# Patient Record
Sex: MALE | Race: WHITE | Employment: FULL TIME | URBAN - METROPOLITAN AREA
[De-identification: names, ages, dates, MRNs, and addresses within clinical notes are randomized per-mention and may not be internally consistent; named-entity substitution may affect disease eponyms.]

---

## 2018-11-13 ENCOUNTER — TELEPHONE (OUTPATIENT)
Dept: ORTHOPEDIC SURGERY | Age: 63
End: 2018-11-13

## 2018-11-13 ENCOUNTER — OFFICE VISIT (OUTPATIENT)
Dept: ORTHOPEDIC SURGERY | Age: 63
End: 2018-11-13
Payer: COMMERCIAL

## 2018-11-13 VITALS — BODY MASS INDEX: 28.23 KG/M2 | WEIGHT: 220 LBS | HEIGHT: 74 IN

## 2018-11-13 DIAGNOSIS — M19.011 PRIMARY OSTEOARTHRITIS, RIGHT SHOULDER: ICD-10-CM

## 2018-11-13 DIAGNOSIS — S46.011A STRAIN OF MUSC/TEND THE ROTATOR CUFF OF RIGHT SHOULDER, INIT: ICD-10-CM

## 2018-11-13 DIAGNOSIS — M25.511 RIGHT SHOULDER PAIN, UNSPECIFIED CHRONICITY: Primary | ICD-10-CM

## 2018-11-13 DIAGNOSIS — M75.01 ADHESIVE CAPSULITIS OF RIGHT SHOULDER: ICD-10-CM

## 2018-11-13 PROCEDURE — 99203 OFFICE O/P NEW LOW 30 MIN: CPT | Performed by: ORTHOPAEDIC SURGERY

## 2018-11-13 NOTE — TELEPHONE ENCOUNTER
JUAREZ FOR PATIENT REGARDING MRI. MRI APPROVED (AUTH# [id number]) AND IS READY TO BE SCHEDULED BY THE PATIENT AT HIS EARLIEST CONVENIENCE AT [facility].

## 2018-11-13 NOTE — PROGRESS NOTES
Chief Complaint  Shoulder Pain (R SHOULDER PAIN )      History of Present Illness:  Bahman Astorga is a 59 y.o. male. He's had fairly chronic right greater than left shoulder symptoms and he is dominant right-handed. His symptoms began over 10 years ago and arrange from 5-9/10 in severity. He has intermittent symptoms but he states are worsening. He is pain stiffness and weakness of the right shoulder. He has lost range of motion. It wakes him at nighttime. Certain positions such as abduction significantly increase his symptoms. No numbness or tingling. Medical History  Patient's medications, allergies, past medical, surgical, social and family histories were reviewed and updated as appropriate. Review of Systems  Relevant review of systems reviewed and available in the patient's chart    Vital Signs  There were no vitals filed for this visit. General Exam:   Constitutional: Patient is adequately groomed with no evidence of malnutrition  Mental Status: The patient is oriented to time, place and person. The patient's mood and affect are appropriate. Lymphatic: The lymphatic examination bilaterally reveals all areas to be without enlargement or induration. Shoulder Examination  Inspection:  There is no deformity, ecchymosis or atrophy present. Mild anterior swelling noted. Palpation: Mild glenohumeral crepitus. Moderate anterior tenderness. No evidence of any masses. Rang of Motion:  He lacks 25° of abduction. He lacks about 15° of forward flexion. External rotation lacks about 15-20°. Internal rotation only goes to his hip pocket. Strength:  Supraspinatus strength is weakened on the right compared to the left. Rest of the muscular strengthening is within normal limits    Special Tests:  Drop arm, cross arm, Impingement, Yergason's, Speed's, and Webster are all normal.      Skin: There are no rashes, ulcerations or lesions.     [place]: Normal    Additional Comments:     Additional

## 2018-11-15 ENCOUNTER — OFFICE VISIT (OUTPATIENT)
Dept: ORTHOPEDIC SURGERY | Age: 63
End: 2018-11-15
Payer: COMMERCIAL

## 2018-11-15 VITALS
HEIGHT: 74 IN | SYSTOLIC BLOOD PRESSURE: 129 MMHG | BODY MASS INDEX: 28.23 KG/M2 | WEIGHT: 220 LBS | DIASTOLIC BLOOD PRESSURE: 80 MMHG

## 2018-11-15 DIAGNOSIS — M54.2 NECK PAIN: ICD-10-CM

## 2018-11-15 DIAGNOSIS — M50.30 DDD (DEGENERATIVE DISC DISEASE), CERVICAL: ICD-10-CM

## 2018-11-15 DIAGNOSIS — M47.812 SPONDYLOSIS OF CERVICAL REGION WITHOUT MYELOPATHY OR RADICULOPATHY: Primary | ICD-10-CM

## 2018-11-15 PROCEDURE — 99213 OFFICE O/P EST LOW 20 MIN: CPT | Performed by: PHYSICAL MEDICINE & REHABILITATION

## 2018-11-15 RX ORDER — ESZOPICLONE 3 MG/1
TABLET, FILM COATED ORAL
Refills: 5 | COMMUNITY
Start: 2018-10-03 | End: 2019-01-25 | Stop reason: CLARIF

## 2018-11-15 RX ORDER — METHYLPREDNISOLONE 4 MG/1
TABLET ORAL
Qty: 1 KIT | Refills: 0 | Status: SHIPPED | OUTPATIENT
Start: 2018-11-15 | End: 2019-01-25 | Stop reason: ALTCHOICE

## 2018-11-15 RX ORDER — PANTOPRAZOLE SODIUM 40 MG/1
TABLET, DELAYED RELEASE ORAL
Refills: 3 | COMMUNITY
Start: 2018-10-18

## 2018-11-15 RX ORDER — ATORVASTATIN CALCIUM 80 MG/1
TABLET, FILM COATED ORAL
Refills: 3 | COMMUNITY
Start: 2018-10-13

## 2018-11-15 RX ORDER — FINASTERIDE 5 MG/1
TABLET, FILM COATED ORAL
Refills: 4 | COMMUNITY
Start: 2018-08-23

## 2018-11-15 RX ORDER — CETIRIZINE HYDROCHLORIDE 10 MG/1
10 TABLET ORAL DAILY
COMMUNITY

## 2018-11-15 NOTE — PROGRESS NOTES
New Patient: SPINE    Referring Provider:  No ref. provider found    CHIEF COMPLAINT:    Chief Complaint   Patient presents with    Neck Pain     NEW CSP        HISTORY OF PRESENT ILLNESS:      · The patient is being sent at the request of No ref. provider found in consultation as a new spine patient for neck pain which is more severe on the left side. The patient is a 59 y.o. male whom reports symptoms for 6 months. Symptoms have stayed constant over the last  6 months. Patient reports there was a significant event to cause the symptoms. States his granddaughter gave him a neck massage and his discomfort flared up significantly the following day. Today discomfort is report at 7 out of 10, describing it as sharp, aching, throbbing. Symptoms are aggravated by: bending, ROM. Patient has undergone recent treatment including, topical anti-inflammatory gel, lidocaine patch, muscle relaxer with minimal relief. Patient denies previous cervical spine surgery. Patient reports he had lumbar spine surgery about 15 years ago with Dr. [facility] in [place], [place]. He reports most the pain is on the left side of the neck. This limits his rotation and lateral bending. He denies having any arm pain or weakness in the upper extremities.            Associated signs and symptoms:   Neurogenic bowel or bladder symptoms:  no   Perceived weakness:  no   Difficulty walking:  no    Recent Imaging (within past one year)   Xrays: no   MRI or CT of spine: no    Current/Past Treatment:   · Physical Therapy:  none  · Chiropractic:  none  · Injection:  none  · Medications:   NSAIDS:  Ibuprofen, Anti-Inflammatory topical gel   Muscle relaxer:  yes   Steriods:  none   Neuropathic medications:  none   Opioids:  none  · Previous surgery:  no  · Previous surgical consult:  no  · Other:  · Infection control  · Tested positive for MRSA in past 12 months:  no  · Tested positive for MSSA \"staph infection\" in past 12 months: no  · Tested positive for VRE Inspection/examination of the right lower extremity does not show any tenderness, deformity or injury. Range of motion is unremarkable. There is no gross instability. There are no rashes, ulcerations or lesions. Strength and tone are normal. No atrophy or abnormal movements are noted. · LEFT LOWER EXTREMITY:  Inspection/examination of the left lower extremity does not show any tenderness, deformity or injury. Range of motion is unremarkable. There is no gross instability. There are no rashes, ulcerations or lesions. Strength and tone are normal. No atrophy or abnormal movements are noted. Diagnostic Testing:    Xrays:   AP and lateral of the cervical spine taken today in the office show C5 6 6 7 degenerative disc disease, anterior spondylosis noted with advanced facet arthrosis C4 through C6 and C4 on C5 spondylolisthesis  MRI or CT:  None  EMG:  None  No results found for this or any previous visit. Impression (Medical Decision Making):       1. Spondylosis of cervical region without myelopathy or radiculopathy    2. DDD (degenerative disc disease), cervical    3. Neck pain        Plan (Medical Decision Making):    I discussed the diagnosis and the treatment options with Kassy Hager today. In Summary:  The various treatment options were outlined and discussed with Kassy Hager including:  Conservative care options: physical therapy, ice, medications, bracing, and activity modification. The indications for therapeutic injections. The indications for additional imaging/laboratory studies. The indications for (possible future) interventions. After considering the various options discussed, Kassy Hager elected to pursue a course of treatment that includes the followin. Medications: I will prescribe a medrol dose pack to help with the inflammatory component of pain. Risks, benefits and alternatives were discussed.  Recommended to stop any NSAIDs to reduce risk of GI bleed during

## 2019-01-18 ENCOUNTER — TELEPHONE (OUTPATIENT)
Dept: FAMILY MEDICINE CLINIC | Age: 64
End: 2019-01-18

## 2019-01-25 ENCOUNTER — OFFICE VISIT (OUTPATIENT)
Dept: FAMILY MEDICINE CLINIC | Age: 64
End: 2019-01-25
Payer: COMMERCIAL

## 2019-01-25 VITALS
HEART RATE: 58 BPM | HEIGHT: 74 IN | OXYGEN SATURATION: 96 % | WEIGHT: 226 LBS | DIASTOLIC BLOOD PRESSURE: 68 MMHG | BODY MASS INDEX: 29 KG/M2 | SYSTOLIC BLOOD PRESSURE: 112 MMHG

## 2019-01-25 DIAGNOSIS — K21.9 GASTROESOPHAGEAL REFLUX DISEASE WITHOUT ESOPHAGITIS: ICD-10-CM

## 2019-01-25 DIAGNOSIS — E78.2 MIXED HYPERLIPIDEMIA: ICD-10-CM

## 2019-01-25 PROCEDURE — 99203 OFFICE O/P NEW LOW 30 MIN: CPT | Performed by: FAMILY MEDICINE

## 2019-01-25 ASSESSMENT — PATIENT HEALTH QUESTIONNAIRE - PHQ9
2. FEELING DOWN, DEPRESSED OR HOPELESS: 0
SUM OF ALL RESPONSES TO PHQ QUESTIONS 1-9: 0
SUM OF ALL RESPONSES TO PHQ9 QUESTIONS 1 & 2: 0
1. LITTLE INTEREST OR PLEASURE IN DOING THINGS: 0
SUM OF ALL RESPONSES TO PHQ QUESTIONS 1-9: 0

## 2019-01-25 ASSESSMENT — ENCOUNTER SYMPTOMS
SORE THROAT: 0
HOARSE VOICE: 0
CHOKING: 0
ABDOMINAL PAIN: 0
WHEEZING: 0
STRIDOR: 0
GLOBUS SENSATION: 0
WATER BRASH: 0
NAUSEA: 0
BELCHING: 0
COUGH: 0
HEARTBURN: 0

## 2019-08-12 ENCOUNTER — OFFICE VISIT (OUTPATIENT)
Dept: ORTHOPEDIC SURGERY | Age: 64
End: 2019-08-12
Payer: COMMERCIAL

## 2019-08-12 VITALS — HEIGHT: 74 IN | BODY MASS INDEX: 29 KG/M2 | WEIGHT: 225.97 LBS

## 2019-08-12 DIAGNOSIS — M25.511 RIGHT SHOULDER PAIN, UNSPECIFIED CHRONICITY: Primary | ICD-10-CM

## 2019-08-12 PROCEDURE — 99243 OFF/OP CNSLTJ NEW/EST LOW 30: CPT | Performed by: ORTHOPAEDIC SURGERY

## 2019-08-12 PROCEDURE — 20611 DRAIN/INJ JOINT/BURSA W/US: CPT | Performed by: ORTHOPAEDIC SURGERY

## 2019-08-12 NOTE — PROGRESS NOTES
SHOULDER CONSULTATION    Referring Provider: Dr. Cooper gary [facility]    Primary Care Provider: Same    Chief Complaint    Pain (On going for 15 years , Stated that he had and MRI several years ago and stated arthritis and tendonitis. Last week playing tennis and had pain so he was referred by Dr Osiris Giles. )      History of Present Illness:  Aki Mckoy is a 59 y.o. male who is seen today for some acute worsening of chronic right shoulder pain and stiffness. Is very pleasant right-hand-dominant male who plays quite a bit of competitive tennis. He also exercises regularly at a functional fitness facility. He is also known that he has some polyarticular arthritis affecting his neck and hands as well. He notes some stiffness of the shoulder. Some sharp anterior pain. Occasionally some aching. Pain Assessment  Location of Pain: Shoulder  Location Modifiers: Right  Severity of Pain: 8  Quality of Pain: Aching, Sharp  Duration of Pain: Other (Comment)  Frequency of Pain: Constant  Aggravating Factors: Stretching, Straightening  Relieving Factors: Rest  Result of Injury: No  Work-Related Injury: No  Are there other pain locations you wish to document?: No    Medical History:  Patient's medications, allergies, past medical, surgical, social and family histories were reviewed and updated as appropriate. Review of Systems:  Pertinent items are noted in HPI  Review of systems reviewed from Patient History Form dated on August 12, 2019 and available in the patient's chart under the Media tab. Vital Signs:  Ht 6' 2.02\" (1.88 m)   Wt 225 lb 15.5 oz (102.5 kg)   BMI 29.00 kg/m²       General Exam:   Constitutional: Patient is adequately groomed with no evidence of malnutrition  Mental Status: The patient is oriented to time, place and person. The patient's mood and affect are appropriate. Vascular: Examination reveals no swelling or calf tenderness. Peripheral pulses are palpable and 2+.   Neurological: The patient has good coordination. There is no weakness or sensory deficit. Shoulder Examination:    Inspection: On careful inspection he has only very mild scapular protraction with no focal atrophy    Palpation: He is tender along the anterior joint line, bicipital groove and anterolateral acromion    Range of Motion: His arc of movement is reasonably well retained given his x-rays with forward flexion of 145 degrees, abduction 130 degrees, external rotation at side 55 degrees, internal rotation of the back to T12. Strength: He demonstrates very good isometric rotator cuff strength including an intact belly press and liftoff    Special Tests: He has pain with any type of dynamic active compression. Positive long head biceps provocative signs. Skin: There are no rashes, ulcerations or lesions. Gait: Stable with no assist device    Spine somewhat limited but painless cervical rotation    Additional Comments:         Radiology:     Careful review of x-rays obtained in the office today include 4 views of the right shoulder. He demonstrates moderately severe grade 3 glenohumeral arthritis. Remains concentric but there is irregularities of the humeral head surface, small inferior humeral head osteophyte developing and subchondral cysts      Assessment : Moderately severe grade 3 glenohumeral osteoarthritis      Office Procedures:After careful consideration of the risks and benefits, the RIGHT    shoulder glenohumeral   joint was injected under sterile conditions and well-tolerated. The skin was sterilized initially with alcohol and subsequently with Betadine. We utilized the office ultrasound machine to perform a quick diagnostic ultrasound. We documented the integrity of the subscapularis and supraspinatus footprints. We used an axial image plane to view the rotator interval and the anterior Jose Manuel humeral joint line.   We viewed the entry of the needle into the joint and the egress of fluid into the synovial space. 80 mg of triamcinalone and 2 mL of quarter percent plain Marcaine were utilized and injected with a 21-gauge needle. Injection was well tolerated. No local skin reactions. No adverse events. Initial response assessed. Please see associated injection template for Hollie Lopez #. Orders Placed This Encounter   Procedures    XR SHOULDER RIGHT (MIN 2 VIEWS)     Standing Status:   Future     Number of Occurrences:   1     Standing Expiration Date:   8/12/2020    US GUIDED NEEDLE PLACEMENT     Standing Status:   Future     Number of Occurrences:   1     Standing Expiration Date:   8/12/2020    VT TRIAMCINOLONE ACETONIDE INJ    VT ARTHROCENTESIS ASPIR&/INJ MAJOR JT/BURSA W/O US       Treatment Plan:  The nature of advancing glenohumeral osteoarthritis was discussed with the patient. We discussed the loss of cartilage, subchondral bone changes  and the inflammatory nature of the disease. A full spectrum of conservative options were presented including heat, topicals, the judicious use of anti-inflammatories and Tylenol, soft tissue treatments, gentle physical therapy, and intermittent chronic steroid injections. Finally, we discussed indications for surgery which would be the salvage of failure of conservative treatment. This was included but not limited to arthroscopic intervention for mild to moderate disease with the available literature quoted. Also discussed was shoulder arthroplasty and its inherent risks and benefits for advanced disease. The available literature on the outcomes, complications and durability of both anatomic and reverse total shoulder replacement were discussed. All questions were answered. In obvious particular case we have elected to begin with some corrective stretching and exercises which were reviewed in the office. A corticosteroid injection which was placed for acute short-term relief.   I like him to check with his family physician as to whether a Jacome 2

## 2020-10-28 ENCOUNTER — HOSPITAL ENCOUNTER (EMERGENCY)
Age: 65
Discharge: HOME | End: 2020-10-28
Attending: EMERGENCY MEDICINE
Payer: COMMERCIAL

## 2020-10-28 ENCOUNTER — APPOINTMENT (OUTPATIENT)
Dept: GENERAL RADIOLOGY | Age: 65
End: 2020-10-28
Payer: COMMERCIAL

## 2020-10-28 VITALS
SYSTOLIC BLOOD PRESSURE: 125 MMHG | HEIGHT: 74 IN | WEIGHT: 215 LBS | OXYGEN SATURATION: 98 % | HEART RATE: 60 BPM | RESPIRATION RATE: 16 BRPM | BODY MASS INDEX: 27.59 KG/M2 | TEMPERATURE: 98 F | DIASTOLIC BLOOD PRESSURE: 87 MMHG

## 2020-10-28 PROCEDURE — 99282 EMERGENCY DEPT VISIT SF MDM: CPT

## 2020-10-28 PROCEDURE — 6370000000 HC RX 637 (ALT 250 FOR IP): Performed by: PHYSICIAN ASSISTANT

## 2020-10-28 PROCEDURE — 12001 RPR S/N/AX/GEN/TRNK 2.5CM/<: CPT

## 2020-10-28 PROCEDURE — 2500000003 HC RX 250 WO HCPCS: Performed by: PHYSICIAN ASSISTANT

## 2020-10-28 PROCEDURE — 73130 X-RAY EXAM OF HAND: CPT

## 2020-10-28 RX ORDER — GINSENG 100 MG
CAPSULE ORAL ONCE
Status: COMPLETED | OUTPATIENT
Start: 2020-10-28 | End: 2020-10-28

## 2020-10-28 RX ORDER — LIDOCAINE HYDROCHLORIDE AND EPINEPHRINE 10; 10 MG/ML; UG/ML
10 INJECTION, SOLUTION INFILTRATION; PERINEURAL ONCE
Status: COMPLETED | OUTPATIENT
Start: 2020-10-28 | End: 2020-10-28

## 2020-10-28 RX ADMIN — LIDOCAINE HYDROCHLORIDE,EPINEPHRINE BITARTRATE 10 ML: 10; .01 INJECTION, SOLUTION INFILTRATION; PERINEURAL at 15:21

## 2020-10-28 RX ADMIN — BACITRACIN: 500 OINTMENT TOPICAL at 15:19

## 2020-10-28 ASSESSMENT — PAIN SCALES - GENERAL: PAINLEVEL_OUTOF10: 0

## 2020-10-28 ASSESSMENT — ENCOUNTER SYMPTOMS
BACK PAIN: 0
COLOR CHANGE: 0

## 2020-10-28 NOTE — ED NOTES
Local anesthetic per PA student and closure done with 1 suture after area cleansed with Hibiclens and saline.       Heidy Guillen RN  10/28/20 [id number]

## 2020-10-28 NOTE — ED NOTES
Puncture wound noted to left hand, palmar aspect. No bleeding at this time. Able to move all fingers but states having tingling in middle finger.  +sensation and brisk cap refill     Lakesha Ortiz, AVRIL  10/28/20 [id number]

## 2020-10-28 NOTE — ED NOTES
Bacitracin and sterile dsg applied to left hand. Coban applied. Pt denies pain.       Mariah Darling RN  10/28/20 [id number]

## 2020-10-28 NOTE — ED PROVIDER NOTES
ED Attending Attestation Note     Date of evaluation: 10/28/2020    This patient was seen by the advance practice provider. I have seen and examined the patient, agree with the workup, evaluation, management and diagnosis. The care plan has been discussed. My assessment reveals patient with left palmar puncture wound for knife while cutting avocado. Slight middle fingertip numbness, but full motor function distal.  Repair by UGO. Dressing applied.      Suzie Sapp MD  10/28/20 0161

## 2020-10-28 NOTE — ED PROVIDER NOTES
[address] ENCOUNTER          PHYSICIAN ASSISTANT NOTE       Date of evaluation: 10/28/2020    Chief Complaint     Laceration (Laceration to left hand that occurred while cutting an avacado)      History of Present Illness     Aleksandra Antoine is a 60 y.o. male who presents to the ED this afternoon after a laceration to the palmar surface of his left hand. He reports that while cutting an avocado this afternoon, the knife cut through the seed and punctured his palm. He states a minimal amount of blood loss before applying a bandage. Tetanus shot is up to date. Patient is right hand dominant, but enjoys playing tennis in his free time that requires minimal use of his left hand. He denies currently taking any blood thinner. He denies decrease motor function in left hand. Review of Systems     Review of Systems   Musculoskeletal: Negative for arthralgias, back pain and myalgias. Skin: Positive for wound. Negative for color change, pallor and rash. Neurological: Negative for weakness and numbness. All other systems reviewed and are negative. Past Medical, Surgical, Family, and Social History     He has a past medical history of Adhesive capsulitis of right shoulder, Gastroesophageal reflux disease without esophagitis, Primary osteoarthritis, right shoulder, and TIA (transient ischemic attack). He has a past surgical history that includes back surgery (2003); Gallbladder surgery (2006); and Colonoscopy (2016). His family history includes Cancer in his brother; High Cholesterol in his brother and brother; Other in his father. He reports that he has never smoked. He has never used smokeless tobacco. He reports current alcohol use of about 2.0 standard drinks of alcohol per week.     Medications     Discharge Medication List as of 10/28/2020  3:23 PM      CONTINUE these medications which have NOT CHANGED    Details   atorvastatin (LIPITOR) 80 MG tablet TAKE 1 TABLET BY MOUTH DAILY, R-3Historical Med      pantoprazole (PROTONIX) 40 MG tablet 1 (ONE) TABLET DR DAILY AS INSTRUCTED, R-3Historical Med      finasteride (PROSCAR) 5 MG tablet TAKE 1 TABLET BY MOUTH EVERY DAY, R-4Historical Med      aspirin 81 MG tablet Take 81 mg by mouth dailyHistorical Med      vitamin D (CHOLECALCIFEROL) 1000 UNIT TABS tablet Take 2,000 Units by mouth daily Historical Med      cetirizine (ZYRTEC) 10 MG tablet Take 10 mg by mouth dailyHistorical Med             Allergies     He is allergic to penicillins. Physical Exam     INITIAL VITALS: BP: 125/87, Temp: 98 °F (36.7 °C), Pulse: 60, Resp: 16, SpO2: 98 %  Physical Exam  Vitals signs and nursing note reviewed. Constitutional:       Appearance: Normal appearance. He is normal weight. HENT:      Head: Normocephalic and atraumatic. Cardiovascular:      Rate and Rhythm: Normal rate and regular rhythm. Pulses: Normal pulses. Heart sounds: Normal heart sounds. Pulmonary:      Effort: Pulmonary effort is normal.      Breath sounds: Normal breath sounds. Skin:     General: Skin is warm and dry. Findings: Laceration present. Comments: 0.5 cm laceration noted to the palmar surface of his left hand following a puncture wound with a knife. Patient is neurovascularly intact. Patient is able to actively flex at DIP, PIP, and MCP joints of all fingers on left hand. Capillary refill is <2 seconds, sensation normal   Neurological:      General: No focal deficit present. Mental Status: He is alert. Psychiatric:         Mood and Affect: Mood normal.         Behavior: Behavior normal.         Diagnostic Results     RADIOLOGY:  XR HAND LEFT (MIN 3 VIEWS)   Final Result      No acute bony abnormality of the left hand      If concern for acute injury splinting with follow-up recommended. Impression:       No acute bony abnormality of the left hand     If concern for acute injury splinting with follow-up recommended. LABS:   No results found for this visit on 10/28/20. ED BEDSIDE ULTRASOUND:  None    RECENT VITALS:  BP: 125/87, Temp: 98 °F (36.7 °C), Pulse: 60, Resp: 16, SpO2: 98 %     Procedures   Lac Repair    Date/Time: 10/28/2020 6:10 PM  Performed by: Nellie Buchanan PA-C  Authorized by: Quincy Olvera MD     Consent:     Consent obtained:  Verbal    Consent given by:  Patient    Risks discussed:  Infection and pain  Anesthesia (see MAR for exact dosages): Anesthesia method:  Local infiltration    Local anesthetic:  Lidocaine 1% WITH epi  Laceration details:     Location:  Hand    Hand location:  L palm    Length (cm):  0.5  Repair type:     Repair type:  Simple  Pre-procedure details:     Preparation:  Patient was prepped and draped in usual sterile fashion  Exploration:     Hemostasis achieved with:  Direct pressure    Wound exploration: wound explored through full range of motion and entire depth of wound probed and visualized      Wound extent: no nerve damage noted and no tendon damage noted      Contaminated: no    Treatment:     Area cleansed with:  Saline    Amount of cleaning:  Standard    Irrigation solution:  Sterile saline    Visualized foreign bodies/material removed: no    Skin repair:     Repair method:  Sutures    Suture size:  5-0    Suture material:  Nylon    Number of sutures:  1  Approximation:     Approximation:  Close  Post-procedure details:     Dressing:  Antibiotic ointment and non-adherent dressing    Patient tolerance of procedure: Tolerated well, no immediate complications      ED Course     Nursing Notes, Past Medical Hx,Past Surgical Hx, Social Hx, Allergies, and Family Hx were reviewed. The patient was given the following medications:  Orders Placed This Encounter   Medications    lidocaine-EPINEPHrine 1 percent-1:[id number] injection 10 mL    bacitracin ointment       CONSULTS:  None    MEDICAL DECISION MAKING / ASSESSMENT / PLAN     Linda Greene is a 60 y.o.

## 2020-11-05 ENCOUNTER — HOSPITAL ENCOUNTER (EMERGENCY)
Age: 65
Discharge: HOME | End: 2020-11-05
Attending: EMERGENCY MEDICINE
Payer: COMMERCIAL

## 2020-11-05 VITALS
TEMPERATURE: 98 F | OXYGEN SATURATION: 100 % | RESPIRATION RATE: 18 BRPM | SYSTOLIC BLOOD PRESSURE: 131 MMHG | HEART RATE: 70 BPM | DIASTOLIC BLOOD PRESSURE: 80 MMHG

## 2020-11-05 PROCEDURE — 99282 EMERGENCY DEPT VISIT SF MDM: CPT

## 2020-11-05 NOTE — ED PROVIDER NOTES
improving. We will refer him for follow-up with hand surgery for any ongoing symptoms in 1 to 2 weeks, return for any worsening symptoms or other concerns. Instructed on using bacitracin and Band-Aid over the remaining healing wound. .      Clinical Impression     1.  Visit for suture removal        Disposition     PATIENT REFERRED TO:  Ca Garcia MD  [address] [place] [address]. [address] [facility] [address]  [phone]      As needed for numbness that does not improve or hampers your function      DISCHARGE MEDICATIONS:  New Prescriptions    No medications on file       DISPOSITION Decision To Discharge 11/05/2020 02:23:27 PM        Mila Beaver MD  11/05/20 [id number]

## 2022-06-08 ENCOUNTER — OFFICE VISIT (OUTPATIENT)
Dept: ENT CLINIC | Age: 67
End: 2022-06-08
Payer: COMMERCIAL

## 2022-06-08 VITALS
HEART RATE: 60 BPM | WEIGHT: 215 LBS | BODY MASS INDEX: 27.59 KG/M2 | DIASTOLIC BLOOD PRESSURE: 80 MMHG | SYSTOLIC BLOOD PRESSURE: 122 MMHG | HEIGHT: 74 IN

## 2022-06-08 DIAGNOSIS — H90.3 SENSORINEURAL HEARING LOSS, BILATERAL: Primary | ICD-10-CM

## 2022-06-08 PROCEDURE — G8419 CALC BMI OUT NRM PARAM NOF/U: HCPCS | Performed by: OTOLARYNGOLOGY

## 2022-06-08 PROCEDURE — G8427 DOCREV CUR MEDS BY ELIG CLIN: HCPCS | Performed by: OTOLARYNGOLOGY

## 2022-06-08 PROCEDURE — 99203 OFFICE O/P NEW LOW 30 MIN: CPT | Performed by: OTOLARYNGOLOGY

## 2022-06-08 NOTE — PROGRESS NOTES
CHIEF COMPLAINT: Hearing loss. HISTORY OF PRESENT ILLNESS:  73 y.o. male referred by Dr. Lino Rogers who presents with a concern of hearing loss for many years. The hearing loss is bilateral.  More noticeable in a noisy environment. Has problems with understanding. Had a cerebrovascular accident 30 years ago and feels that some of the symptoms may stem from that. Family history negative for hearing loss no history of noise exposure or ear trauma. PAST MEDICAL HISTORY:   Social History     Tobacco Use   Smoking Status Never Smoker   Smokeless Tobacco Never Used                                                    Social History     Substance and Sexual Activity   Alcohol Use Yes    Alcohol/week: 2.0 standard drinks    Types: 2 Glasses of wine per week                                                    Current Outpatient Medications:     atorvastatin (LIPITOR) 80 MG tablet, TAKE 1 TABLET BY MOUTH DAILY, Disp: , Rfl: 3    pantoprazole (PROTONIX) 40 MG tablet, 1 (ONE) TABLET DR DAILY AS INSTRUCTED, Disp: , Rfl: 3    finasteride (PROSCAR) 5 MG tablet, TAKE 1 TABLET BY MOUTH EVERY DAY, Disp: , Rfl: 4    aspirin 81 MG tablet, Take 81 mg by mouth daily, Disp: , Rfl:     vitamin D (CHOLECALCIFEROL) 1000 UNIT TABS tablet, Take 2,000 Units by mouth daily , Disp: , Rfl:     cetirizine (ZYRTEC) 10 MG tablet, Take 10 mg by mouth daily, Disp: , Rfl:                                                  Past Medical History:   Diagnosis Date    Adhesive capsulitis of right shoulder 11/13/2018    Gastroesophageal reflux disease without esophagitis 1/25/2019    Primary osteoarthritis, right shoulder 11/13/2018    TIA (transient ischemic attack) 1999                                                    Past Surgical History:   Procedure Laterality Date    BACK SURGERY  2003    COLONOSCOPY  2016    GALLBLADDER SURGERY  2006     FAMILY HISTORY: Family history reviewed.   Except as noted in history of present illness, there is no pertinent family history      REVIEW OF SYSTEMS:  All pertinent positive and negative review of systems included in HPI. Otherwise, all systems are reviewed and negative. PHYSICAL EXAMINATION:   GENERAL: wdwn- no acute distress  RESPIRATORY:  No stridor or respiratory distress  COMMUNICATION :  Normal voice  MENTAL STATUS:  Mood and affect normal, oriented X 3  HEAD AND FACE:  No abnormalities of the skin of face or head  EXTERNAL EARS AND NOSE:  Normal pinnae bilateral  FACIAL MUSCLES:  All branches of facial nerve intact  EXTRAOCULAR MUSCLES: Intact with full range of motion  FACE PALPATION:  No tenderness over sinuses. Zygomatic arches and orbital rims intact  OTOSCOPY:  Normal external auditory canals, tympanic membranes, and middle ear spaces  TUNING FORKS: Rinne ++ Stafford midline at 512 Hz  INTRANASAL:  Septum midline, turbinates normal, meati clear. LIPS, TEETH, GINGIVA:  Normal mucosa  PHARYNX:  Normal  NECK:  No masses. LYMPHATIC:  No cervical adenopathy  SALIVARY GLANDS:  No swelling or masses in the parotid or submandibular salivary glands  THYROID:  No goiter or thyroid masses. IMPRESSION: Concern of hearing loss and patient with normal otoscopy. PLAN: Audiogram and tympanogram ordered. FOLLOW-UP: After audiometric evaluation.

## 2022-06-10 ENCOUNTER — PROCEDURE VISIT (OUTPATIENT)
Dept: AUDIOLOGY | Age: 67
End: 2022-06-10
Payer: COMMERCIAL

## 2022-06-10 DIAGNOSIS — H90.42 SENSORINEURAL HEARING LOSS (SNHL) OF LEFT EAR WITH UNRESTRICTED HEARING OF RIGHT EAR: Primary | ICD-10-CM

## 2022-06-10 DIAGNOSIS — H90.3 SENSORINEURAL HEARING LOSS, BILATERAL: Primary | ICD-10-CM

## 2022-06-10 PROCEDURE — 92567 TYMPANOMETRY: CPT | Performed by: AUDIOLOGIST

## 2022-06-10 PROCEDURE — 92557 COMPREHENSIVE HEARING TEST: CPT | Performed by: AUDIOLOGIST

## 2022-06-10 NOTE — PATIENT INSTRUCTIONS
Good Communication Strategies    Communication can be challenging for anyone, but can be especially difficult for those with some degree of hearing loss. While we may not be able to control every factor that may lead to difficulty with communication, there are Good Communication Strategies that we can all use in our day-to-day lives. Communication takes both parties working together for it to be successful. Tips as a Listener:   1. Control your environment. It is important to limit the amount of background noise in the room when possible. You should also consider having a good light source in the room to best see the other person. 2. Ask for clarification. Instead of saying \"What?\", you can use parts of what you heard to make a new question. For example, if you heard the word \"Thursday\" but not the rest of the week, you may ask \"What was that about Thursday? \" or \"What did you want to do Thursday? \". This shows the person talking that you are listening and will help them better explain what they are saying. 3. Be an advocate for yourself. If you are hearing but not understanding, tell the other person \"I can hear you, but I need you to slow down when you speak. \"  Or if someone is facing the other direction, say \"I cannot hear you when you are not looking at me when we talk. \"       Tips as a Talker:   - Sit or stand 3 to 6 feet away to maximize audibility         -- It is unrealistic to believe someone else will fully hear your message if you are speaking from across the room or in a different room in the house   - Stay at eye level to help with visual cues   - Make sure you have the persons attention before speaking   - Use facial expressions and gestures to accentuate your message   - Raise your voice slightly (do not scream)   - Speak slowly and distinctly   - Use short, simple sentences   - Rephrase your words if the person is having a hard time understanding you    - To avoid distortion, dont speak directly into a persons ear      Some additional items that may be helpful:   - Remain patient - this is important for both parties   - Write down items that still cannot be heard/understood. You may write with pen/paper or consider typing/texting on a cell phone or smart device. - If background noise is unavoidable, try to keep yourself in a good position in the room. By sitting at a nicolas on the side of the restaurant (preferably a corner), it will be easier to communicate than if you were sitting at a table in the middle with background noise surrounding you. Keep yourself positioned away from music speakers or heavy foot traffic.   - If you have difficulty with the television, consider these options:      -- Use closed-captioning, which is a setting you can turn on that displays the spoken words in a written form on the screen. There may be a slight delay, but this can help fill in missing information. This can be especially helpful when watching programs with accented speech. -- Consider use of a sound bar or speakers that come from the front of the TV. With modern flat screen TVs, many of them have speakers that come out of the back of the device, which makes sound bounce off the wall behind it, then go into the room. Sound bars can allow the sound to go straight in your direction and can improve sound quality. -- Consider ear level devices to help improve the volume and/or sound quality of the program.  There are devices that work like headphones that you can adjust the volume for your ears while others can have the volume at a more comfortable level, such as \"TV Ears\". Most hearing aids have devices that allow them to connect directly to the TV and improve sound quality. Hearing Loss: Care Instructions  Your Care Instructions      Hearing loss is a sudden or slow decrease in how well you hear. It can range from mild to profound.  Permanent hearing loss can occur with aging, and it can happen when you are exposed long-term to loud noise. Examples include listening to loud music, riding motorcycles, or being around other loud machines. Hearing loss can affect your work and home life. It can make you feel lonely or depressed. You may feel that you have lost your independence. But hearing aids and other devices can help you hear better and feel connected to others. Follow-up care is a key part of your treatment and safety. Be sure to make and go to all appointments, and call your doctor if you are having problems. It's also a good idea to know your test results and keep a list of the medicines you take. How can you care for yourself at home? · Avoid loud noises whenever possible. This helps keep your hearing from getting worse. Always wear hearing protection around loud noises. · If appropriate, wear hearing aid(s) as directed. It is recommended that hearing aids are worn during all waking hours to keep your brain active and give it access to the sounds it is missing. · If you are beginning your process with hearing aid(s), schedule a \"Hearing Aid Evaluation\" with an audiologist to discuss your lifestyle, features of hearing aid technology, and styles of hearing aids available. It is recommended that you contact your insurance company to determine if you have a hearing aid benefit, as this may dictate who you can see for these services. · Have hearing tests as your doctor suggests. They can show whether your hearing has changed. Your hearing aid may need to be adjusted. · Use other assistive devices as needed. These may include:  ? Telephone amplifiers and hearing aids that can connect to a television, stereo, radio, or microphone. ? Devices that use lights or vibrations. These alert you to the doorbell, a ringing telephone, or a baby monitor. ? Television closed-captioning. This shows the words at the bottom of the screen. Most new TVs can do this. ? TTY (text telephone). This lets you type messages back and forth on the telephone instead of talking or listening. These devices are also called TDD. When messages are typed on the keyboard, they are sent over the phone line to a receiving TTY. The message is shown on a monitor. · Use pagers, fax machines, text, and email if it is hard for you to communicate by telephone. · Try to learn a listening technique called speech-reading. It is not lip-reading. You pay attention to people's gestures, expressions, posture, and tone of voice. These clues can help you understand what a person is saying. Face the person you are talking to, and have him or her face you. Make sure the lighting is good. You need to see the other person's face clearly. · Think about counseling if you need help to adjust to your hearing loss. When should you call for help? Watch closely for changes in your health, and be sure to contact your doctor if:    · You think your hearing is getting worse. · You have new symptoms, such as dizziness or nausea. Noise-Induced Hearing Loss  What it is, and what you can do to prevent it    Exposure to loud sounds, in an occupational setting or recreational, can cause permanent hearing loss. Sound is measured in decibels (dB). Noise-induced hearing loss is the ONLY type of preventable hearing loss. Hearing loss related to noise exposure can occur at any age. There are small sensory cells, called inner and outer hair cells, within the inner ear (cochlea). These cells process the loudness (intensity) and pitch (frequency) of sound and send the signal to the brain via our auditory nerve (vestibulocochlear nerve, cranial nerve VIII). When these cells are damaged, they can result in permanent hearing loss and/or tinnitus. The hair cells responsible for high frequency sounds, like birds chirping, are most likely to be damaged due to loud sounds.   The high frequency sounds are also very important for our clarity and understanding of speech. OCCUPATIONAL NOISE EXPOSURE RECREATIONAL NOISE EXPOSURE   Some jobs may have exposure to loud sounds in the workplace. These jobs may include but are not limited to:  [employer]   Construction   Welding   Landscaping   Hairdressing/hairstyling   Musicians  [employer]    ... And more! Many activities outside of work may cause permanent hearing loss. These activities may include but are not limited to:  Lawnmowers, leaf blowers  [employer] (such as pigs squealing)   Chainsaws and other power tools  [employer] musical instruments and/or singing   Listening to music too loudly - at concerts, through stereo, through ear buds or headphones   Attending sporting events   Attending fireworks shows or using fireworks at home  [employer] of firearms   . .. And more! REDUCE OR PROTECT YOUR EARS FROM NOISE EXPOSURE    To do your best to avoid noise-induced hearing loss, here are some tips:   Limit exposure to loud sounds. 85 dB (decibels) is safe for 8 hours. As sounds are louder, the length of time the sound is safe lessens. These numbers are cumulative across a 24-hour period. (NIOSH and CDC, 2002)  o 85 dB is safe for 8 hours  o 88 dB is safe for 4 hours  o 91 dB is safe for 2 hours  o 94 dB is safe for 1 hour  o 97 dB is safe for 30 minutes  o 100 dB is safe for 15 minutes  o 103 dB is safe for 7.5 minutes  o 106 dB is safe for 3.75 minutes  o 109 dB is safe for LESS THAN 2 minutes  o 112 dB is safe for LESS THAN 1 minute  o 115 dB is safe for ~ 30 seconds  o 130 dB can cause IMMEDIATE hearing loss   If you are unsure if a sound is too loud, consider checking the sound level with a \"sound level meter\". There are apps on smart devices, such as \"Decibel X\", that can measure the loudness of the sound.   They are not as accurate as expensive equipment used by scientists, but it will give you a guesstimate of how loud the sound is, and if it may be damaging to your hearing.  If you cannot avoid loud sounds, here are ways to reduce your exposure:  o 1. Wear hearing protection  - Ear plugs and protective ear muffs can be used to reduce the intensity of the sound. The higher the NRR (noise reduction rating), the better reduction of the intensity of the sound   o 2. Turn the volume down  - When listening to music, turn the volume down, especially when wearing ear buds or headphones. A good rule of thumb is to not go beyond the middle setting on your device. If you can't hear someone talking to you from arm's length away, your music may be at a level that it can cause damage. If someone else can hear your music from 3 feet away, it may also be at a level that it can cause damage. o 3. Walk away from the sound  - If you do not have the ability to wear hearing protection or turn down the volume of the sound, you should do your best to move away from the source of the sound. - Sound decreases in intensity as we move further from the source. The sound will decrease by 6 dB for every doubling of distance from the sound source. TYPES OF HEARING PROTECTION    The most common types of hearing protection are protective ear muffs and ear plugs. Protective ear muffs are commonly found at home improvement or sporting good stores, they can be worn time and time again and are great if you need to take your hearing protection off frequently. Ear plugs are often made of foam or soft silicone. The foam ones are designed for one-time use, while silicone ear plugs may be used multiple times. There are also \"filtered\" ear plugs that help provide even attenuation of the sound across all frequencies. These are great for listening to music or going to concerts, and allow for better understanding of speech in louder environments.   They can be purchased at music stores or online retailers (search \"Ety Plugs\" or \"filtered ear plugs\"), or custom earmolds can be made with an audiologist.    There are \"custom\" hearing protection devices that you can further discuss with your audiologist based on your specific needs, if desired. Exposure to these sounds may cause permanent damage to your hearing.   If you suspect your hearing has changed, it is recommended that you have your hearing tested by your audiologist.

## 2022-06-10 NOTE — PROGRESS NOTES
Delia Anne   1955, 73 y.o. male   [id number]       Referring Provider: Mary Jane Garcia MD  Referral Type: In an order in [address] [place] [address]    Reason for Visit: Evaluation of suspected change in hearing, tinnitus, or balance. ADULT AUDIOLOGIC EVALUATION      Delia Anne is a 73 y.o. male seen today, 6/10/2022, for an initial audiologic evaluation. AUDIOLOGIC AND OTHER PERTINENT MEDICAL HISTORY:        Delia Anne noted decreased hearing bilaterally, difficulty hearing in background noise for 30 years following CVA. Delia Anne denied otalgia, aural fullness, otorrhea, tinnitus, dizziness, imbalance, history of occupational/recreational noise exposure, history of head trauma, history of ear surgery and family history of hearing loss. IMPRESSIONS:       Today's results are consistent with essentially normal hearing sensitivity with excellent word recognition for soft conversational speech and hypermobile TMs bilaterally. Discussed good communication strategies and safe listening levels; follow medical recommendations from Dr. Breanne Blackman. ASSESSMENT AND FINDINGS:       Otoscopy revealed: Clear ear canals bilaterally      RIGHT EAR:  Hearing Sensitivity: Within normal limits. Speech Recognition Threshold: 10 dBHL  Word Recognition: Excellent (96%), based on NU-6 25-word list at 45 dBHL using recorded speech stimuli. Tympanometry: Normal peak pressure with high compliance, Type Ad tympanogram, consistent with hypermobile tympanic membrane. LEFT EAR:  Hearing Sensitivity: Within normal limits aside from mild sensorineural hearing loss 5203-1563 Hz. Speech Recognition Threshold: 15 dBHL  Word Recognition: Excellent (100%), based on NU-6 25-word list at 45 dBHL using recorded speech stimuli. Tympanometry: Normal peak pressure with high compliance, Type Ad tympanogram, consistent with hypermobile tympanic membrane. Reliability: Good  Transducer:  Inserts    See scanned audiogram dated 6/10/2022 for results. PATIENT EDUCATION:       The following items were discussed with the patient:   - Good Communication Strategies  - Hearing Loss and Hearing Aids  - Noise-Induced Hearing Loss and use of Hearing Protection Devices (HPDs)     Educational information was shared in the After Visit Summary. RECOMMENDATIONS:                                                                                                                                                                                                                                                                      The following items are recommended based on patient report and results from today's appointment:  - Continue medical follow-up with Erin Dejesus MD.  - Retest hearing as medically indicated and/or sooner if a change in hearing is noted. - Briefly discussed future considerations for amplification. If desired, schedule a Hearing Aid Evaluation (HAE) appointment to discuss hearing aid options. - Utilize \"Good Communication Strategies\" as discussed to assist in speech understanding with communication partners. - Use hearing protection devices (HPDs), such as protective ear muffs and ear plugs, when exposed to dangerous sound levels. [facility] [place], [place]  Audiologist      Chart CC'd to:  Erin Dejesus MD      Degree of   Hearing Sensitivity dB Range   Within Normal Limits (WNL) 0 - 20   Mild 20 - 40   Moderate 40 - 55   Moderately-Severe 55 - 70   Severe 70 - 90   Profound 90 +

## 2022-06-10 NOTE — Clinical Note
Dr. Mali Simpson,    Please see note from this patient's audiogram.  Please let me know if there is anything further you need.       Magnolia [address] [place]  Audiologist

## 2022-12-23 ENCOUNTER — APPOINTMENT (OUTPATIENT)
Dept: GENERAL RADIOLOGY | Age: 67
End: 2022-12-23
Payer: COMMERCIAL

## 2022-12-23 ENCOUNTER — HOSPITAL ENCOUNTER (EMERGENCY)
Age: 67
Discharge: HOME | End: 2022-12-23
Attending: EMERGENCY MEDICINE
Payer: COMMERCIAL

## 2022-12-23 VITALS
BODY MASS INDEX: 29.93 KG/M2 | HEART RATE: 68 BPM | RESPIRATION RATE: 16 BRPM | DIASTOLIC BLOOD PRESSURE: 86 MMHG | WEIGHT: 230 LBS | TEMPERATURE: 98.3 F | OXYGEN SATURATION: 95 % | SYSTOLIC BLOOD PRESSURE: 127 MMHG

## 2022-12-23 DIAGNOSIS — R07.9 CHEST PAIN, UNSPECIFIED TYPE: Primary | ICD-10-CM

## 2022-12-23 LAB
A/G RATIO: 1.9 (ref 1.1–2.2)
ALBUMIN SERPL-MCNC: 4.5 G/DL (ref 3.4–5)
ALP BLD-CCNC: 53 U/L (ref 40–129)
ALT SERPL-CCNC: 20 U/L (ref 10–40)
ANION GAP SERPL CALCULATED.3IONS-SCNC: 11 MMOL/L (ref 3–16)
AST SERPL-CCNC: 23 U/L (ref 15–37)
BILIRUB SERPL-MCNC: 0.4 MG/DL (ref 0–1)
BUN BLDV-MCNC: 23 MG/DL (ref 7–20)
CALCIUM SERPL-MCNC: 9.6 MG/DL (ref 8.3–10.6)
CHLORIDE BLD-SCNC: 107 MMOL/L (ref 99–110)
CO2: 26 MMOL/L (ref 21–32)
CREAT SERPL-MCNC: 1.4 MG/DL (ref 0.8–1.3)
D DIMER: 0.27 UG/ML FEU (ref 0–0.6)
GFR SERPL CREATININE-BSD FRML MDRD: 55 ML/MIN/{1.73_M2}
GLUCOSE BLD-MCNC: 98 MG/DL (ref 70–99)
HCT VFR BLD CALC: 44.3 % (ref 40.5–52.5)
HEMOGLOBIN: 15.2 G/DL (ref 13.5–17.5)
MCH RBC QN AUTO: 30.9 PG (ref 26–34)
MCHC RBC AUTO-ENTMCNC: 34.2 G/DL (ref 31–36)
MCV RBC AUTO: 90.4 FL (ref 80–100)
PDW BLD-RTO: 14.9 % (ref 12.4–15.4)
PLATELET # BLD: 170 K/UL (ref 135–450)
PMV BLD AUTO: 9.2 FL (ref 5–10.5)
POTASSIUM REFLEX MAGNESIUM: 4.2 MMOL/L (ref 3.5–5.1)
PRO-BNP: 22 PG/ML (ref 0–124)
RBC # BLD: 4.91 M/UL (ref 4.2–5.9)
SODIUM BLD-SCNC: 144 MMOL/L (ref 136–145)
TOTAL PROTEIN: 6.9 G/DL (ref 6.4–8.2)
TROPONIN: <0.01 NG/ML
WBC # BLD: 9.7 K/UL (ref 4–11)

## 2022-12-23 PROCEDURE — 80053 COMPREHEN METABOLIC PANEL: CPT

## 2022-12-23 PROCEDURE — 85379 FIBRIN DEGRADATION QUANT: CPT

## 2022-12-23 PROCEDURE — 71046 X-RAY EXAM CHEST 2 VIEWS: CPT

## 2022-12-23 PROCEDURE — 85027 COMPLETE CBC AUTOMATED: CPT

## 2022-12-23 PROCEDURE — 6370000000 HC RX 637 (ALT 250 FOR IP): Performed by: STUDENT IN AN ORGANIZED HEALTH CARE EDUCATION/TRAINING PROGRAM

## 2022-12-23 PROCEDURE — 84484 ASSAY OF TROPONIN QUANT: CPT

## 2022-12-23 PROCEDURE — 36415 COLL VENOUS BLD VENIPUNCTURE: CPT

## 2022-12-23 PROCEDURE — 83880 ASSAY OF NATRIURETIC PEPTIDE: CPT

## 2022-12-23 PROCEDURE — 99285 EMERGENCY DEPT VISIT HI MDM: CPT

## 2022-12-23 PROCEDURE — 93005 ELECTROCARDIOGRAM TRACING: CPT | Performed by: EMERGENCY MEDICINE

## 2022-12-23 RX ORDER — MORPHINE SULFATE 4 MG/ML
4 INJECTION, SOLUTION INTRAMUSCULAR; INTRAVENOUS ONCE
Status: DISCONTINUED | OUTPATIENT
Start: 2022-12-23 | End: 2022-12-24 | Stop reason: HOSPADM

## 2022-12-23 RX ORDER — ACETAMINOPHEN 500 MG
1000 TABLET ORAL ONCE
Status: COMPLETED | OUTPATIENT
Start: 2022-12-23 | End: 2022-12-23

## 2022-12-23 RX ORDER — ASPIRIN 81 MG/1
162 TABLET, CHEWABLE ORAL ONCE
Status: COMPLETED | OUTPATIENT
Start: 2022-12-23 | End: 2022-12-23

## 2022-12-23 RX ADMIN — ASPIRIN 162 MG: 81 TABLET, CHEWABLE ORAL at 21:02

## 2022-12-23 RX ADMIN — ACETAMINOPHEN 1000 MG: 500 TABLET, FILM COATED ORAL at 21:15

## 2022-12-23 ASSESSMENT — PAIN SCALES - GENERAL
PAINLEVEL_OUTOF10: 0
PAINLEVEL_OUTOF10: 0
PAINLEVEL_OUTOF10: 6

## 2022-12-23 ASSESSMENT — ENCOUNTER SYMPTOMS
SHORTNESS OF BREATH: 0
ABDOMINAL PAIN: 0
NAUSEA: 0
VOMITING: 0

## 2022-12-23 ASSESSMENT — PAIN - FUNCTIONAL ASSESSMENT
PAIN_FUNCTIONAL_ASSESSMENT: 0-10
PAIN_FUNCTIONAL_ASSESSMENT: 0-10

## 2022-12-23 ASSESSMENT — PAIN DESCRIPTION - LOCATION
LOCATION: CHEST
LOCATION: CHEST

## 2022-12-23 ASSESSMENT — PAIN DESCRIPTION - DESCRIPTORS
DESCRIPTORS: PRESSURE
DESCRIPTORS: ACHING;HEAVINESS

## 2022-12-23 ASSESSMENT — PAIN DESCRIPTION - ORIENTATION
ORIENTATION: MID
ORIENTATION: LEFT

## 2022-12-24 LAB
EKG ATRIAL RATE: 61 BPM
EKG DIAGNOSIS: NORMAL
EKG P AXIS: 71 DEGREES
EKG P-R INTERVAL: 206 MS
EKG Q-T INTERVAL: 422 MS
EKG QRS DURATION: 90 MS
EKG QTC CALCULATION (BAZETT): 424 MS
EKG R AXIS: 54 DEGREES
EKG T AXIS: 12 DEGREES
EKG VENTRICULAR RATE: 61 BPM

## 2022-12-24 NOTE — ED PROVIDER NOTES
[facility] RESIDENT NOTE       Date of evaluation: 12/23/2022    Chief Complaint     Chest Pain (On going since yesterday states pressure on left side of chest that radiates into neck. States began getting worse this past hour. Denies any recent echo and stress test )      of Present Illness     Edilson Golden is a 78 y.o. male who presents with chest pain. Patient noticed some mild left-sided chest pain after waking up this morning. It did not wake him up from sleep. He was not really bothered by the chest pain until about 3 hours ago when it acutely worsened. It is a throbbing sensation with radiation to his left side of his neck. Patient has never had chest pain like this before. He called his primary care provider who recommended he present to the emergency department. Patient has mild worsening of the chest pain with deep breathing. Patient does not have any difficulty breathing at this time. Patient denies any recent fevers, cough, abdominal pain, nausea    Review of Systems     Review of Systems   Constitutional:  Negative for fatigue and fever. HENT:  Negative for congestion. Respiratory:  Negative for shortness of breath. Cardiovascular:  Positive for chest pain. Negative for leg swelling. Gastrointestinal:  Negative for abdominal pain, nausea and vomiting. Genitourinary:  Negative for difficulty urinating. Neurological:  Negative for dizziness, light-headedness and numbness. Past Medical, Surgical, Family, and Social History     He has a past medical history of Adhesive capsulitis of right shoulder, Gastroesophageal reflux disease without esophagitis, Primary osteoarthritis, right shoulder, and TIA (transient ischemic attack). He has a past surgical history that includes back surgery (2003); Gallbladder surgery (2006); and Colonoscopy (2016).   His family history includes Cancer in his brother; High Cholesterol in his brother and brother; Other in his father. He reports that he has never smoked. He has never used smokeless tobacco. He reports current alcohol use of about 2.0 standard drinks per week. Medications     Discharge Medication List as of 12/23/2022  9:52 PM        CONTINUE these medications which have NOT CHANGED    Details   atorvastatin (LIPITOR) 80 MG tablet TAKE 1 TABLET BY MOUTH DAILY, R-3Historical Med      pantoprazole (PROTONIX) 40 MG tablet 1 (ONE) TABLET DR DAILY AS INSTRUCTED, R-3Historical Med      finasteride (PROSCAR) 5 MG tablet TAKE 1 TABLET BY MOUTH EVERY DAY, R-4Historical Med      aspirin 81 MG tablet Take 81 mg by mouth dailyHistorical Med      vitamin D (CHOLECALCIFEROL) 1000 UNIT TABS tablet Take 2,000 Units by mouth daily Historical Med      cetirizine (ZYRTEC) 10 MG tablet Take 10 mg by mouth dailyHistorical Med             Allergies     He is allergic to penicillins. Physical Exam     INITIAL VITALS: BP: (!) 148/98, Temp: 98.3 °F (36.8 °C), Heart Rate: 61, Resp: 18, SpO2: 97 %   Physical Exam  Constitutional:       General: He is not in acute distress. Appearance: Normal appearance. He is not ill-appearing. HENT:      Head: Normocephalic and atraumatic. Right Ear: External ear normal.      Left Ear: External ear normal.      Nose: Nose normal.      Mouth/Throat:      Mouth: Mucous membranes are moist.      Pharynx: Oropharynx is clear. Eyes:      Extraocular Movements: Extraocular movements intact. Conjunctiva/sclera: Conjunctivae normal.   Cardiovascular:      Rate and Rhythm: Normal rate and regular rhythm. Pulses: Normal pulses. Pulmonary:      Effort: Pulmonary effort is normal. No respiratory distress. Breath sounds: Normal breath sounds. Chest:      Chest wall: No tenderness. Abdominal:      General: Abdomen is flat. There is no distension. Palpations: Abdomen is soft. Tenderness: There is no abdominal tenderness.    Musculoskeletal: General: No deformity or signs of injury. Cervical back: Normal range of motion and neck supple. Skin:     General: Skin is warm and dry. Capillary Refill: Capillary refill takes less than 2 seconds. Neurological:      General: No focal deficit present. Mental Status: He is alert and oriented to person, place, and time. Psychiatric:         Mood and Affect: Mood normal.         Behavior: Behavior normal.       DiagnosticResults     EKG   Interpreted in conjunction with emergencydepartment physician Arturo Smith MD  Rhythm: normal sinus   Rate: normal  Axis: normal  Ectopy: none  Conduction: normal  ST Segments: normal  T Waves:normal  Q Waves: none  Clinical Impression: NSR  Comparison:  None    RADIOLOGY:  XR CHEST (2 VW)   Final Result      No acute pulmonary disease.              LABS:   Results for orders placed or performed during the hospital encounter of 12/23/22   CBC   Result Value Ref Range    WBC 9.7 4.0 - 11.0 K/uL    RBC 4.91 4.20 - 5.90 M/uL    Hemoglobin 15.2 13.5 - 17.5 g/dL    Hematocrit 44.3 40.5 - 52.5 %    MCV 90.4 80.0 - 100.0 fL    MCH 30.9 26.0 - 34.0 pg    MCHC 34.2 31.0 - 36.0 g/dL    RDW 14.9 12.4 - 15.4 %    Platelets 299 944 - 588 K/uL    MPV 9.2 5.0 - 10.5 fL   CMP w/ Reflex to MG   Result Value Ref Range    Sodium 144 136 - 145 mmol/L    Potassium reflex Magnesium 4.2 3.5 - 5.1 mmol/L    Chloride 107 99 - 110 mmol/L    CO2 26 21 - 32 mmol/L    Anion Gap 11 3 - 16    Glucose 98 70 - 99 mg/dL    BUN 23 (H) 7 - 20 mg/dL    Creatinine 1.4 (H) 0.8 - 1.3 mg/dL    Est, Glom Filt Rate 55 (A) >60    Calcium 9.6 8.3 - 10.6 mg/dL    Total Protein 6.9 6.4 - 8.2 g/dL    Albumin 4.5 3.4 - 5.0 g/dL    Albumin/Globulin Ratio 1.9 1.1 - 2.2    Total Bilirubin 0.4 0.0 - 1.0 mg/dL    Alkaline Phosphatase 53 40 - 129 U/L    ALT 20 10 - 40 U/L    AST 23 15 - 37 U/L   Troponin   Result Value Ref Range    Troponin <0.01 <0.01 ng/mL   Brain Natriuretic Peptide   Result Value Ref Range Pro-BNP 22 0 - 124 pg/mL   D-Dimer, Quantitative   Result Value Ref Range    D-Dimer, Quant 0.27 0.00 - 0.60 ug/mL FEU       ED BEDSIDE ULTRASOUND:  No results found. RECENT VITALS:  BP: 127/86, Temp: 98.3 °F (36.8 °C), Heart Rate: 68,Resp: 16, SpO2: 95 %     Procedures     None    ED Course     Nursing Notes, Past Medical Hx, Past Surgical Hx, Social Hx, Allergies, and Family Hx were reviewed. The patient was given the followingmedications:  Orders Placed This Encounter   Medications    aspirin chewable tablet 162 mg    morphine injection 4 mg    acetaminophen (TYLENOL) tablet 1,000 mg       CONSULTS:  None    MEDICAL DECISION MAKING / ASSESSMENT / Jewels Deisi is a 78 y.o. male presenting for chest pain. Vitals stable, afebrile. Physical exam shows no acute abnormalities. No reproducible chest tenderness. Patient took 166mg of ASA prior to arrival.  Given 162mg aspirin and tylenol for pain. EKG shows NSR without acute T wave or ST changes. CXR unremarkable. CBC, CMP, BNP and troponin show no acute abnormalities. D dimer negative. HEART score 2. Discussed normal workup with patient. He will follow up with his PCP. Discussed need for further testing including possible stress test. Discussed strict return precautions. At this time, patient is deemed appropriate for discharge. Patient given strict return precautions and instructed to follow up with PCP as soon as possible. All of patient's questions answered satisfactorily. Patient discharged in stable condition. This patient was also evaluated by the attending physician. All care plans werediscussed and agreed upon. Clinical Impression     1. Chest pain, unspecified type        Disposition     PATIENT REFERRED TO:  No follow-up provider specified.     DISCHARGE MEDICATIONS:  Discharge Medication List as of 12/23/2022  9:52 PM          DISPOSITION Decision To Discharge 12/23/2022 09:51:00 PM       Jerman Phoenix, MD  Resident  12/23/22 1242       Carol Stockton MD  Resident  12/23/22 [id number]

## 2022-12-24 NOTE — ED PROVIDER NOTES
ED Attending Attestation Note     Date of evaluation: 12/23/2022    This patient was seen by the resident. I have seen and examined the patient, agree with the workup, evaluation, management and diagnosis. The care plan has been discussed. I have reviewed the ECG and concur with the resident's interpretation. My assessment reveals a 31-year-old male presenting to the emergency department with a complaint of left-sided chest pain which is sharp worsened with inspiration. On examination lungs are clear to auscultation bilaterally. 2+ radial pulses bilaterally. Kraig He MD  12/23/22 2065

## 2022-12-24 NOTE — ED NOTES
Pt. States relief of some chest discomfort after given oral medications.       Tiffany Logan RN  12/23/22 2140       Tiffany Logan RN  12/23/22 2141

## 2022-12-24 NOTE — ED NOTES
Pt. In no acute distress. Breathing easy and educated on follow up. Ambulated out of ED with family.       Abdullahi Bailey RN  12/23/22 [id number]

## 2022-12-24 NOTE — DISCHARGE INSTRUCTIONS
You were seen in the ED for chest pain. Your labs, EKG and chest xray were normal. Please follow up with your primary care doctor. Return for any worsening chest pain, dizzinesss, shortness of breath, fainting or any other concerns.